# Patient Record
Sex: FEMALE | Race: WHITE
[De-identification: names, ages, dates, MRNs, and addresses within clinical notes are randomized per-mention and may not be internally consistent; named-entity substitution may affect disease eponyms.]

---

## 2017-09-15 ENCOUNTER — HOSPITAL ENCOUNTER (OUTPATIENT)
Dept: HOSPITAL 80 - FIMAGING | Age: 42
End: 2017-09-15
Attending: INTERNAL MEDICINE
Payer: COMMERCIAL

## 2017-09-15 DIAGNOSIS — Z12.31: Primary | ICD-10-CM

## 2017-09-15 PROCEDURE — G0202 SCR MAMMO BI INCL CAD: HCPCS

## 2018-09-20 ENCOUNTER — HOSPITAL ENCOUNTER (OUTPATIENT)
Dept: HOSPITAL 80 - FIMAGING | Age: 43
End: 2018-09-20
Attending: INTERNAL MEDICINE
Payer: COMMERCIAL

## 2018-09-20 DIAGNOSIS — Z12.31: Primary | ICD-10-CM

## 2018-12-02 ENCOUNTER — HOSPITAL ENCOUNTER (EMERGENCY)
Dept: HOSPITAL 80 - FED | Age: 43
Discharge: HOME | End: 2018-12-02
Payer: COMMERCIAL

## 2018-12-02 VITALS — DIASTOLIC BLOOD PRESSURE: 53 MMHG | SYSTOLIC BLOOD PRESSURE: 116 MMHG

## 2018-12-02 DIAGNOSIS — M62.830: Primary | ICD-10-CM

## 2018-12-02 NOTE — EDPHY
H & P


Time Seen by Provider: 12/02/18 10:19


HPI/ROS: 





CHIEF COMPLAINT:  Severe back spasms





HISTORY OF PRESENT ILLNESS:  Patient slept on a mattress about a week and half 

ago while traveling, her back has been hurting her since then.  She did go for 

a walk today between 7 and 745 but then her back seized up and she was lying on 

the ground at home and she was really having difficulty even standing or 

walking because of the pain.


It was severe and located in her lower thoracic and upper lumbar area.  Did not 

radiate.  Not associated with weakness or numbness in extremities or 

incontinence.  No fever or chills.  No recent injury or trauma.  She got 100 

mcg of IV fentanyl by paramedics and feels better now.  She did take a 5 mg 

oral diazepam at home and that helped a little bit.  She has been taking 400 mg 

oral ibuprofen on and intermittent basis over the past couple of days.





REVIEW OF SYSTEMS:


Eye: no change in vision


ENT: no sore throat


Cardiac: no chest pain or syncope


Pulmonary: no cough or SOB


Abdomen: no vomiting, diarrhea, abdominal pain


Musculoskeletal:  HPI


Skin: no rash


Neuro: no headache


Constitutional: no fever


: no urinary symptoms





A comprehensive 10 point review of systems is otherwise negative aside from 

elements mentioned in the history of present illness.





PAST MEDICAL HISTORY:  Negative





Social history:  Primary care [facility]





General Appearance: Alert and conversant, cooperative.


Eyes: No scleral  icterus. 


ENT, Mouth: Normal mucous membranes.


Respiratory: Normal respiratory effort, breath sounds equal, lungs are clear to 

auscultation.


Cardiovascular:  Regular rate and rhythm.


Gastrointestinal:  Abdomen is soft and non tender.  No pulsatile mass.


Neurological: Alert, face symmetric, normal motor and sensory in extremities.   

Toes downgoing, no clonus, patellar reflexes 1+ bilateral and symmetric, 

straight leg raising negative bilaterally.


Skin: Warm and dry, no rashes.


Musculoskeletal:  Patient has some paraspinal muscle tenderness in the area of 

pain.  No midline spinal tenderness or deformity.


Psychiatric: Not agitated.





Emergency Department course/MDM:





After arrival patient received IV diazepam and lidocaine patch.  She feels like 

the main problem is muscle spasm which I think is likely.


I think it is unlikely that she has spinal cord injury, fracture, pyelonephritis

, epidural abscess.





1158:  Re-evaluated, ambulatory, feels stable for discharge which I think is 

reasonable.  She is requesting muscle relaxant, no narcotics, will follow up 

tomorrow with her physical therapist.  We both agree that imaging is not 

clinically indicated at this time.


Smoking Status: Never smoked


Constitutional: 


 Initial Vital Signs











Temperature (C)  36.5 C   12/02/18 10:19


 


Heart Rate  54 L  12/02/18 10:19


 


Respiratory Rate  18   12/02/18 10:19


 


Blood Pressure  98/75 L  12/02/18 10:19


 


O2 Sat (%)  100   12/02/18 10:19








 











O2 Delivery Mode               Room Air














Allergies/Adverse Reactions: 


 





No Known Allergies Allergy (Verified 05/21/13 17:23)


 








Home Medications: 














 Medication  Instructions  Recorded


 


Prenatal Vit27&Calcium/Iron/FA 1 each PO DAILY 05/21/13





[Prenatal Rx 1 Tablet (RX)]  


 


Diazepam 5 mg PO Q8 PRN #7 tablet 12/02/18


 


Lidocaine 5% [Lidoderm 5% Patch] 1 ea TD DAILY #5 patch 12/02/18














Medical Decision Making





- Data Points


Medications Given: 


 








Discontinued Medications





Diazepam (Valium)  5 mg IVP EDNOW ONE


   Stop: 12/02/18 10:35


   Last Admin: 12/02/18 10:45 Dose:  5 mg


Miscellaneous Medication (Icy Hot Lidocaine/Menthol 4%/1% Patch)  1 patch TD 

EDNOW ONE


   Stop: 12/02/18 10:35


   Last Admin: 12/02/18 10:37 Dose:  1 patch








Departure





- Departure


Disposition: Home, Routine, Self-Care


Clinical Impression: 


 Spasm of back muscles





Condition: Good


Instructions:  Acute Low Back Pain (ED)


Additional Instructions: 


Oral ibuprofen 600 mg every 6-8 hr for the next 3 days.


Lidocaine patches prescribed.


Oral diazepam as needed for rescue for severe pain.


Referrals: 


Faby Denton MD [Primary Care Provider] - As per Instructions


Prescriptions: 


Diazepam 5 mg PO Q8 PRN #7 tablet


 PRN Reason: muscle spasm


Lidocaine 5% [Lidoderm 5% Patch] 1 ea TD DAILY #5 patch